# Patient Record
Sex: FEMALE | Race: WHITE
[De-identification: names, ages, dates, MRNs, and addresses within clinical notes are randomized per-mention and may not be internally consistent; named-entity substitution may affect disease eponyms.]

---

## 2023-04-29 ENCOUNTER — HOSPITAL ENCOUNTER (OUTPATIENT)
Dept: HOSPITAL 76 - EMS | Age: 33
Discharge: ANOTHER HOSPITAL | End: 2023-04-29
Payer: SELF-PAY

## 2023-04-29 ENCOUNTER — HOSPITAL ENCOUNTER (EMERGENCY)
Dept: HOSPITAL 76 - ED | Age: 33
LOS: 1 days | Discharge: HOME | End: 2023-04-30
Payer: SELF-PAY

## 2023-04-29 DIAGNOSIS — F10.129: Primary | ICD-10-CM

## 2023-04-29 DIAGNOSIS — Z78.1: ICD-10-CM

## 2023-04-29 DIAGNOSIS — R41.82: Primary | ICD-10-CM

## 2023-04-29 DIAGNOSIS — R45.6: ICD-10-CM

## 2023-04-29 DIAGNOSIS — Z20.822: ICD-10-CM
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
## 2023-04-29 DIAGNOSIS — R09.89: ICD-10-CM

## 2023-04-29 PROCEDURE — 80307 DRUG TEST PRSMV CHEM ANLYZR: CPT

## 2023-04-29 PROCEDURE — 83690 ASSAY OF LIPASE: CPT

## 2023-04-29 PROCEDURE — 81001 URINALYSIS AUTO W/SCOPE: CPT

## 2023-04-29 PROCEDURE — 85610 PROTHROMBIN TIME: CPT

## 2023-04-29 PROCEDURE — 80329 ANALGESICS NON-OPIOID 1 OR 2: CPT

## 2023-04-29 PROCEDURE — 83605 ASSAY OF LACTIC ACID: CPT

## 2023-04-29 PROCEDURE — 96366 THER/PROPH/DIAG IV INF ADDON: CPT

## 2023-04-29 PROCEDURE — 99284 EMERGENCY DEPT VISIT MOD MDM: CPT

## 2023-04-29 PROCEDURE — 93005 ELECTROCARDIOGRAM TRACING: CPT

## 2023-04-29 PROCEDURE — 81025 URINE PREGNANCY TEST: CPT

## 2023-04-29 PROCEDURE — 80320 DRUG SCREEN QUANTALCOHOLS: CPT

## 2023-04-29 PROCEDURE — 81003 URINALYSIS AUTO W/O SCOPE: CPT

## 2023-04-29 PROCEDURE — 96365 THER/PROPH/DIAG IV INF INIT: CPT

## 2023-04-29 PROCEDURE — 80306 DRUG TEST PRSMV INSTRMNT: CPT

## 2023-04-29 PROCEDURE — 87086 URINE CULTURE/COLONY COUNT: CPT

## 2023-04-29 PROCEDURE — 85025 COMPLETE CBC W/AUTO DIFF WBC: CPT

## 2023-04-29 PROCEDURE — 99285 EMERGENCY DEPT VISIT HI MDM: CPT

## 2023-04-29 PROCEDURE — 84443 ASSAY THYROID STIM HORMONE: CPT

## 2023-04-29 PROCEDURE — 96368 THER/DIAG CONCURRENT INF: CPT

## 2023-04-29 PROCEDURE — 83735 ASSAY OF MAGNESIUM: CPT

## 2023-04-29 PROCEDURE — 82550 ASSAY OF CK (CPK): CPT

## 2023-04-29 PROCEDURE — 87635 SARS-COV-2 COVID-19 AMP PRB: CPT

## 2023-04-29 PROCEDURE — 36415 COLL VENOUS BLD VENIPUNCTURE: CPT

## 2023-04-29 PROCEDURE — 96375 TX/PRO/DX INJ NEW DRUG ADDON: CPT

## 2023-04-29 PROCEDURE — 80053 COMPREHEN METABOLIC PANEL: CPT

## 2023-04-29 NOTE — ED PHYSICIAN DOCUMENTATION
Restraint Face-to-Face





- Immediate Situation


Face to Face Evaluation Date: 04/29/23


Face to Face Evaluation Time: 23:58


Restraint Classification: Violent, physical


Restraint Type: Soft extremity





- Patient's Reaction & Behaviors


Safety: Non-compliant


Verbal: Asking for information, Crying/Tearful


Harm: Potential harm to self, Potential harm to others





- Behavioral Condition


Attitude: Indifferent


Behavior: Withdrawn


Orientation: Person, Place


Mood: Labile, Anxious





- Evaluation


Review of Systems: 





Unable to perform comprehensive ROS secondary to altered mentation.


Pertinent History/Illicit Drugs/Medications/Results: 





Patient denies use of illicit substances.  Was found adjacent multiple 

prescription medications as well as to identified capsules containing white 

powder.  Please see MDM for further information.





- Plan


Need to Continue or Terminate Violent or Chemical Restraint: 





Continued

## 2023-04-29 NOTE — ED PHYSICIAN DOCUMENTATION
History of Present Illness





- Stated complaint


Stated Complaint: AMS





- Additonal information


Additional information: 





Patient is 33-year-old female brought to the emergency department with chief 

complaint of altered mental status.





Per EMS that she has been living with an elderly individual for the last 3 

months.





EMS was contacted when she was found minimally responsive by her landlady.





EMS reported normal blood sugar.  She received Narcan which did improve her 

mentation but she became somewhat agitated and was placed in restraints prior to

arrival.





She was found with a bottle of pills initially prescribed [facility] Mr. Macario abreu.  She is unable to tell me who this is.





The bottle pills contain the following, five 3 mg tablets of ivermectin with 

expiration date 6/14/2022, 1 5-325 Norco, five 300 mg gabapentin, as well as 2 

capsules containing a white powder with no other identifier.





She is altered at this time, orientated to person and place only.  Denies 

substance abuse, intensive self-harm, pain or other acute issues but does state 

"I need to use the bathroom". Further history is limited by her altered 

mentation.





Review of Systems


Unable to obtain: AMS





PD PAST MEDICAL HISTORY





- Allergies


Allergies/Adverse Reactions: 


                                    Allergies











Allergy/AdvReac Type Severity Reaction Status Date / Time


 


morphine AdvReac  Unknown Verified 04/30/23 01:19














PD ED PE NORMAL





- Vitals


Vital signs reviewed: Yes (WNL)





- General


General: Other (Patient alert and orientated to person and place only.)





- HEENT


HEENT: Atraumatic, PERRL, EOMI, Other (Conjunctival injections bilaterally)





- Cardiac


Cardiac: RRR





- Respiratory


Respiratory: No respiratory distress





- Abdomen


Abdomen: Normal bowel sounds, Non tender





- Female 


Female : Deferred





- Rectal


Rectal: Deferred





- Derm


Derm: Normal color





- Extremities


Extremities: No deformity





- Neuro


Neuro: Alert and oriented X 3, CNs 2-12 intact, No motor deficit, Normal speech





- Psych


Psych: Other (Patient's somnolent, answers questions tangentially.)





Results





- Vitals


Vitals: 


                               Vital Signs - 24 hr











  04/30/23 04/30/23





  06:08 08:03


 


Temperature  37.0 C


 


Heart Rate 79 68


 


Respiratory 14 18





Rate  


 


Blood Pressure 95/60 108/76


 


O2 Saturation 97 96








                                     Oxygen











O2 Source                      Room air

















- EKG (time done)


  ** 0015


EKG releavant findings:: 


EKG personally interpreted by author of this note. Relevant findings are: 


Sinus rhythm with rate 74 bpm.  Left axis deviation.  Normal WI, QRS intervals. 

Borderline QTc at 481.  No ST segment elevations.  Nonspecific ST and T wave 

abnormalities throughout.  Significant motion artifact throughout..





- Labs


Labs: 


                                Laboratory Tests











  04/29/23 04/29/23 04/29/23





  23:45 23:45 23:45


 


WBC  6.0  


 


RBC  4.00 L  


 


Hgb  13.4  


 


Hct  40.6  


 


MCV  101.5 H  


 


MCH  33.5 H  


 


MCHC  33.0  


 


RDW  12.8  


 


Plt Count  247  


 


MPV  8.8  


 


Neut # (Auto)  2.5  


 


Lymph # (Auto)  2.8  


 


Mono # (Auto)  0.5  


 


Eos # (Auto)  0.1  


 


Baso # (Auto)  0.1  


 


Absolute Nucleated RBC  0.00  


 


Nucleated RBC %  0.0  


 


PT   


 


INR   


 


Sodium   140 


 


Potassium   3.4 L 


 


Chloride   97 L 


 


Carbon Dioxide   27 


 


Anion Gap   16.0 H 


 


BUN   10 


 


Creatinine   0.6 


 


Estimated GFR (MDRD)   114 


 


Glucose   99 


 


Lactic Acid   


 


Calcium   8.9 


 


Magnesium   2.2 


 


Total Bilirubin   0.3 


 


AST   54 H 


 


ALT   32 


 


Alkaline Phosphatase   59 


 


Total Creatine Kinase   205 


 


Total Protein   8.2 


 


Albumin   4.4 


 


Globulin   3.8 


 


Albumin/Globulin Ratio   1.2 


 


Lipase   46 


 


TSH    2.67


 


Urine Color   


 


Urine Clarity   


 


Urine pH   


 


Ur Specific Gravity   


 


Urine Protein   


 


Urine Glucose (UA)   


 


Urine Ketones   


 


Urine Occult Blood   


 


Urine Nitrite   


 


Urine Bilirubin   


 


Urine Urobilinogen   


 


Ur Leukocyte Esterase   


 


Ur Microscopic Review   


 


Urine Culture Comments   


 


Urine HCG, Qual   


 


Salicylates   < 6.0 


 


Urine Opiates Screen   


 


Ur Oxycodone Screen   


 


Urine Methadone Screen   


 


Ur Propoxyphene Screen   


 


Acetaminophen   < 10 L 


 


Ur Barbiturates Screen   


 


Ur Tricyclics Screen   


 


Ur Phencyclidine Scrn   


 


Ur Amphetamine Screen   


 


U Methamphetamines Scrn   


 


U Benzodiazepines Scrn   


 


Urine Cocaine Screen   


 


U Cannabinoids Screen   


 


Ethyl Alcohol   436.0 


 


SARS-CoV-2 (PCR)   














  04/29/23 04/30/23 04/30/23





  23:49 00:00 00:06


 


WBC   


 


RBC   


 


Hgb   


 


Hct   


 


MCV   


 


MCH   


 


MCHC   


 


RDW   


 


Plt Count   


 


MPV   


 


Neut # (Auto)   


 


Lymph # (Auto)   


 


Mono # (Auto)   


 


Eos # (Auto)   


 


Baso # (Auto)   


 


Absolute Nucleated RBC   


 


Nucleated RBC %   


 


PT    10.0


 


INR    0.9


 


Sodium   


 


Potassium   


 


Chloride   


 


Carbon Dioxide   


 


Anion Gap   


 


BUN   


 


Creatinine   


 


Estimated GFR (MDRD)   


 


Glucose   


 


Lactic Acid   


 


Calcium   


 


Magnesium   


 


Total Bilirubin   


 


AST   


 


ALT   


 


Alkaline Phosphatase   


 


Total Creatine Kinase   


 


Total Protein   


 


Albumin   


 


Globulin   


 


Albumin/Globulin Ratio   


 


Lipase   


 


TSH   


 


Urine Color   YELLOW 


 


Urine Clarity   CLEAR 


 


Urine pH   7.0 


 


Ur Specific Gravity   <=1.005 


 


Urine Protein   NEGATIVE 


 


Urine Glucose (UA)   NEGATIVE 


 


Urine Ketones   NEGATIVE 


 


Urine Occult Blood   NEGATIVE 


 


Urine Nitrite   NEGATIVE 


 


Urine Bilirubin   NEGATIVE 


 


Urine Urobilinogen   0.2 (NORMAL) 


 


Ur Leukocyte Esterase   NEGATIVE 


 


Ur Microscopic Review   NOT INDICATED 


 


Urine Culture Comments   NOT INDICATED 


 


Urine HCG, Qual   NEGATIVE 


 


Salicylates   


 


Urine Opiates Screen   NEGATIVE 


 


Ur Oxycodone Screen   NEGATIVE 


 


Urine Methadone Screen   NEGATIVE 


 


Ur Propoxyphene Screen   NEGATIVE 


 


Acetaminophen   


 


Ur Barbiturates Screen   NEGATIVE 


 


Ur Tricyclics Screen   NEGATIVE 


 


Ur Phencyclidine Scrn   NEGATIVE 


 


Ur Amphetamine Screen   NEGATIVE 


 


U Methamphetamines Scrn   NEGATIVE 


 


U Benzodiazepines Scrn   NEGATIVE 


 


Urine Cocaine Screen   NEGATIVE 


 


U Cannabinoids Screen   NEGATIVE 


 


Ethyl Alcohol   


 


SARS-CoV-2 (PCR)  NOT DETECTED  














  04/30/23





  00:06


 


WBC 


 


RBC 


 


Hgb 


 


Hct 


 


MCV 


 


MCH 


 


MCHC 


 


RDW 


 


Plt Count 


 


MPV 


 


Neut # (Auto) 


 


Lymph # (Auto) 


 


Mono # (Auto) 


 


Eos # (Auto) 


 


Baso # (Auto) 


 


Absolute Nucleated RBC 


 


Nucleated RBC % 


 


PT 


 


INR 


 


Sodium 


 


Potassium 


 


Chloride 


 


Carbon Dioxide 


 


Anion Gap 


 


BUN 


 


Creatinine 


 


Estimated GFR (MDRD) 


 


Glucose 


 


Lactic Acid  2.6 H


 


Calcium 


 


Magnesium 


 


Total Bilirubin 


 


AST 


 


ALT 


 


Alkaline Phosphatase 


 


Total Creatine Kinase 


 


Total Protein 


 


Albumin 


 


Globulin 


 


Albumin/Globulin Ratio 


 


Lipase 


 


TSH 


 


Urine Color 


 


Urine Clarity 


 


Urine pH 


 


Ur Specific Gravity 


 


Urine Protein 


 


Urine Glucose (UA) 


 


Urine Ketones 


 


Urine Occult Blood 


 


Urine Nitrite 


 


Urine Bilirubin 


 


Urine Urobilinogen 


 


Ur Leukocyte Esterase 


 


Ur Microscopic Review 


 


Urine Culture Comments 


 


Urine HCG, Qual 


 


Salicylates 


 


Urine Opiates Screen 


 


Ur Oxycodone Screen 


 


Urine Methadone Screen 


 


Ur Propoxyphene Screen 


 


Acetaminophen 


 


Ur Barbiturates Screen 


 


Ur Tricyclics Screen 


 


Ur Phencyclidine Scrn 


 


Ur Amphetamine Screen 


 


U Methamphetamines Scrn 


 


U Benzodiazepines Scrn 


 


Urine Cocaine Screen 


 


U Cannabinoids Screen 


 


Ethyl Alcohol 


 


SARS-CoV-2 (PCR) 














PD Medical Decision Making





- ED course


Complexity details: reviewed results, re-evaluated patient, considered 

differential


ED course: 





Patient is 33-year-old female being brought into the emergency department via 

EMS with altered mentation.  Patient demonstrates slurred speech, disorganized 

behavior on arrival to the emergency department most consistent with acute 

intoxication.  Denied alcohol or illicit substance abuse.  Was found with a pill

bottle that did not have her name on it that was found to contain five 3 mg 

ivermectin, five 300 mg Gabapentin, 1 5-325 Norco and 2 capsules containing an 

unidentified white powder.





Patient's EKG is outlined above was negative for indications of acute cardiac 

ischemia or dysrhythmia.  Labs were all generally within normal limits or 

nonactionable with the exception of a significantly elevated blood ethanol.  She

was given IV hydration in the emergency department and provided with a single 

dose of lorazepam for chemical calming.  She was trialed out of restraints 

shortly after arrival to the emergency department.  She did require repeated 

redirection by nursing staff.  On reevaluation at approximately 0630 hrs. 

patient continued to demonstrate findings consistent with acute alcohol 

intoxication.  She continued to deny the use of any alcohol or other illicit 

substance.  At this time I will be signing her out to the oncoming physician, 

please see their documentation for further detail.





Departure





- Departure


Disposition: 01 Home, Self Care


Clinical Impression: 


Alcohol intoxication


Qualifiers:


 Complication of substance-induced condition: with unspecified complication 

Qualified Code(s): F10.929 - Alcohol use, unspecified with intoxication, 

unspecified





Condition: Stable


Instructions:  ED Alcohol Intoxication


Comments: 


You were brought to the emergency department last night after having had too 

much alcohol. Drinking too much can be dangerous to yourself and potentially to 

others.  Please consider reaching out to your primary care provider for help 

with your substance use. 





If at anytime you are having thoughts of hurting yourself or anyone else please 

call 911 or return to the emergency department.








[facility] [address]


Main Phone: [phone]


The [facility] offers a monitored and safe 

setting for individuals withdrawing from alcohol and drugs, and counseling for 

individuals experiencing a mental health crisis. All services are provided in a 

10-bed facility where intensive medical monitoring is required along with 

stabilization services. The goal of these services is to assess a clients 

mental health and substance use disorder related needs, and assist them in 

accessing the services they need to recover.


Discharge Date/Time: 04/30/23 08:40

## 2023-04-30 VITALS — DIASTOLIC BLOOD PRESSURE: 76 MMHG | SYSTOLIC BLOOD PRESSURE: 108 MMHG

## 2023-04-30 LAB
ALBUMIN DIAFP-MCNC: 4.4 G/DL (ref 3.2–5.5)
ALBUMIN/GLOB SERPL: 1.2 {RATIO} (ref 1–2.2)
ALP SERPL-CCNC: 59 IU/L (ref 42–121)
ALT SERPL W P-5'-P-CCNC: 32 IU/L (ref 10–60)
AMPHET UR QL SCN: NEGATIVE
ANION GAP SERPL CALCULATED.4IONS-SCNC: 16 MMOL/L (ref 6–13)
APAP SERPL-MCNC: < 10 UG/ML (ref 10–30)
AST SERPL W P-5'-P-CCNC: 54 IU/L (ref 10–42)
BARBITURATES UR QL SCN>300 NG/ML: NEGATIVE
BASOPHILS NFR BLD AUTO: 0.1 10^3/UL (ref 0–0.1)
BASOPHILS NFR BLD AUTO: 0.8 %
BENZODIAZ UR QL SCN: NEGATIVE
BILIRUB BLD-MCNC: 0.3 MG/DL (ref 0.2–1)
BUN SERPL-MCNC: 10 MG/DL (ref 6–20)
CALCIUM UR-MCNC: 8.9 MG/DL (ref 8.5–10.3)
CHLORIDE SERPL-SCNC: 97 MMOL/L (ref 101–111)
CK SERPL-CCNC: 205 IU/L (ref 22–269)
CLARITY UR REFRACT.AUTO: CLEAR
CO2 SERPL-SCNC: 27 MMOL/L (ref 21–32)
COCAINE UR-SCNC: NEGATIVE UMOL/L
CREAT SERPLBLD-SCNC: 0.6 MG/DL (ref 0.4–1)
EOSINOPHIL # BLD AUTO: 0.1 10^3/UL (ref 0–0.7)
EOSINOPHIL NFR BLD AUTO: 2.2 %
ERYTHROCYTE [DISTWIDTH] IN BLOOD BY AUTOMATED COUNT: 12.8 % (ref 12–15)
ETHANOL BLD-MCNC: 436 MG/DL
GFRSERPLBLD MDRD-ARVRAT: 114 ML/MIN/{1.73_M2} (ref 89–?)
GLOBULIN SER-MCNC: 3.8 G/DL (ref 2.1–4.2)
GLUCOSE SERPL-MCNC: 99 MG/DL (ref 70–100)
GLUCOSE UR QL STRIP.AUTO: NEGATIVE MG/DL
HCG UR QL: NEGATIVE
HCT VFR BLD AUTO: 40.6 % (ref 37–47)
HGB UR QL STRIP: 13.4 G/DL (ref 12–16)
INR PPP: 0.9 (ref 0.8–1.2)
KETONES UR QL STRIP.AUTO: NEGATIVE MG/DL
LIPASE SERPL-CCNC: 46 U/L (ref 22–51)
LYMPHOCYTES # SPEC AUTO: 2.8 10^3/UL (ref 1.5–3.5)
LYMPHOCYTES NFR BLD AUTO: 47.5 %
MAGNESIUM SERPL-MCNC: 2.2 MG/DL (ref 1.7–2.8)
MCH RBC QN AUTO: 33.5 PG (ref 27–31)
MCHC RBC AUTO-ENTMCNC: 33 G/DL (ref 32–36)
MCV RBC AUTO: 101.5 FL (ref 81–99)
METHADONE UR QL SCN: NEGATIVE
METHAMPHET UR QL SCN: NEGATIVE
MONOCYTES # BLD AUTO: 0.5 10^3/UL (ref 0–1)
MONOCYTES NFR BLD AUTO: 8 %
NEUTROPHILS # BLD AUTO: 2.5 10^3/UL (ref 1.5–6.6)
NEUTROPHILS # SNV AUTO: 6 X10^3/UL (ref 4.8–10.8)
NEUTROPHILS NFR BLD AUTO: 41.3 %
NITRITE UR QL STRIP.AUTO: NEGATIVE
NRBC # BLD AUTO: 0 /100WBC
NRBC # BLD AUTO: 0 X10^3/UL
OPIATES UR QL SCN: NEGATIVE
PDW BLD AUTO: 8.8 FL (ref 7.9–10.8)
PH UR STRIP.AUTO: 7 PH (ref 5–7.5)
PLATELET # BLD: 247 10^3/UL (ref 130–450)
POTASSIUM SERPL-SCNC: 3.4 MMOL/L (ref 3.5–5)
PROT SPEC-MCNC: 8.2 G/DL (ref 6.7–8.2)
PROT UR STRIP.AUTO-MCNC: NEGATIVE MG/DL
PROTHROM ACT/NOR PPP: 10 SECS (ref 9.9–12.6)
RBC # UR STRIP.AUTO: NEGATIVE /UL
RBC MAR: 4 10^6/UL (ref 4.2–5.4)
SALICYLATES SERPL-MCNC: < 6 MG/DL
SODIUM SERPLBLD-SCNC: 140 MMOL/L (ref 135–145)
SP GR UR STRIP.AUTO: <=1.005 (ref 1–1.03)
THC UR QL SCN: NEGATIVE
UROBILINOGEN UR QL STRIP.AUTO: (no result) E.U./DL
UROBILINOGEN UR STRIP.AUTO-MCNC: NEGATIVE MG/DL
VOLATILE DRUGS POS SERPL SCN: (no result)

## 2023-04-30 NOTE — ED PHYSICIAN DOCUMENTATION
ED Addendum





- Addendum


Addendum: 





04/30/23 07:35 Patient signed out to me by Dr. Olivia at shift change.  Patient 

is wanting to go home. When asked what happened yesterday she states that she 

drinks too much and has occasionally done this but never to this extent.  She 

states that she is coping with going through a divorce.  She denies that she was

trying to hurt herself.  She denies doing anything about the pills that she was 

found with and is unsure why she would have had such a pill bottle with her.  

The name on the pill bottle matches with her father.She denies that she took any

pills or again that she was trying to harm herself.  She denies wanting any 

resources for substance abuse and denies that she drinks daily.She plans to 

reach out to her family for additional support.  She is calling her friend and 

family for a ride home.  She is speaking clearly, no slurring. She is ambulating

without assistance. Clinically appears appropriate for discharge. 








Departure





- Departure


Disposition: 01 Home, Self Care


Clinical Impression: 


 Alcohol intoxication





Condition: Stable


Instructions:  ED Alcohol Intoxication


Comments: 


You were brought to the emergency department last night after having had too 

much alcohol. Drinking too much can be dangerous to yourself and potentially to 

others.  Please consider reaching out to your primary care provider for help 

with your substance use. 





If at anytime you are having thoughts of hurting yourself or anyone else please 

call 911 or return to the emergency department.








[facility]


[address] [place] [address]


Main Phone: [phone]


The [facility] offers a monitored and safe 

setting for individuals withdrawing from alcohol and drugs, and counseling for 

individuals experiencing a mental health crisis. All services are provided in a 

10-bed facility where intensive medical monitoring is required along with 

stabilization services. The goal of these services is to assess a clients 

mental health and substance use disorder related needs, and assist them in 

accessing the services they need to recover.


Discharge Date/Time: 04/30/23 08:40